# Patient Record
Sex: FEMALE | Race: WHITE | NOT HISPANIC OR LATINO | Employment: OTHER | URBAN - METROPOLITAN AREA
[De-identification: names, ages, dates, MRNs, and addresses within clinical notes are randomized per-mention and may not be internally consistent; named-entity substitution may affect disease eponyms.]

---

## 2017-11-22 ENCOUNTER — HOSPITAL ENCOUNTER (OUTPATIENT)
Dept: MAMMOGRAPHY | Facility: CLINIC | Age: 77
Discharge: HOME | End: 2017-11-22
Attending: OBSTETRICS & GYNECOLOGY | Admitting: OBSTETRICS & GYNECOLOGY
Payer: MEDICARE

## 2017-11-22 DIAGNOSIS — Z12.31 VISIT FOR SCREENING MAMMOGRAM: ICD-10-CM

## 2017-11-22 PROCEDURE — G0202 SCR MAMMO BI INCL CAD: HCPCS

## 2018-08-16 ENCOUNTER — HOSPITAL ENCOUNTER (OUTPATIENT)
Facility: CLINIC | Age: 78
Discharge: HOME | End: 2018-08-16
Attending: COLON & RECTAL SURGERY | Admitting: COLON & RECTAL SURGERY
Payer: MEDICARE

## 2018-08-16 ENCOUNTER — SURGERY (OUTPATIENT)
Age: 78
End: 2018-08-16

## 2018-08-16 VITALS
RESPIRATION RATE: 16 BRPM | DIASTOLIC BLOOD PRESSURE: 75 MMHG | SYSTOLIC BLOOD PRESSURE: 132 MMHG | HEIGHT: 60 IN | OXYGEN SATURATION: 98 % | BODY MASS INDEX: 27.48 KG/M2 | WEIGHT: 140 LBS

## 2018-08-16 LAB — COLONOSCOPY: NORMAL

## 2018-08-16 PROCEDURE — 45380 COLONOSCOPY AND BIOPSY: CPT | Performed by: COLON & RECTAL SURGERY

## 2018-08-16 PROCEDURE — 25000128 H RX IP 250 OP 636: Performed by: COLON & RECTAL SURGERY

## 2018-08-16 PROCEDURE — 88305 TISSUE EXAM BY PATHOLOGIST: CPT | Mod: 26 | Performed by: COLON & RECTAL SURGERY

## 2018-08-16 PROCEDURE — 88305 TISSUE EXAM BY PATHOLOGIST: CPT | Performed by: COLON & RECTAL SURGERY

## 2018-08-16 PROCEDURE — G0500 MOD SEDAT ENDO SERVICE >5YRS: HCPCS | Performed by: COLON & RECTAL SURGERY

## 2018-08-16 RX ORDER — LIDOCAINE 40 MG/G
CREAM TOPICAL
Status: DISCONTINUED | OUTPATIENT
Start: 2018-08-16 | End: 2018-08-16 | Stop reason: HOSPADM

## 2018-08-16 RX ORDER — FENTANYL CITRATE 50 UG/ML
INJECTION, SOLUTION INTRAMUSCULAR; INTRAVENOUS PRN
Status: DISCONTINUED | OUTPATIENT
Start: 2018-08-16 | End: 2018-08-16 | Stop reason: HOSPADM

## 2018-08-16 RX ORDER — ONDANSETRON 2 MG/ML
4 INJECTION INTRAMUSCULAR; INTRAVENOUS
Status: DISCONTINUED | OUTPATIENT
Start: 2018-08-16 | End: 2018-08-16 | Stop reason: HOSPADM

## 2018-08-16 RX ADMIN — FENTANYL CITRATE 50 MCG: 50 INJECTION, SOLUTION INTRAMUSCULAR; INTRAVENOUS at 13:31

## 2018-08-16 RX ADMIN — MIDAZOLAM 1 MG: 1 INJECTION INTRAMUSCULAR; INTRAVENOUS at 13:31

## 2018-08-16 NOTE — H&P
[facility]    History and Physical  Colon and Rectal Surgery     Date of Admission:  8/16/2018      Assessment & Plan   Maegan King is a 78 year old female who presents for colonoscopy.    Indication: history of polyps  Plan for Colonoscopy with possible biopsy, possible polypectomy. We discussed the risks, benefits and alternatives and the patient wished to proceed.    The above has been forwarded to the consulting provider.      Elie Olmedo MD  [facility], [facility]  [phone]        Code Status   Full Code    Primary Care Physician   Stephany Contreras      History is obtained from the patient    History of Present Illness   Maegan King is a 78 year old female who presents with a history of colon polyps    Past Medical History    I have reviewed this patient's medical history and updated it with pertinent information if needed.   Past Medical History:   Diagnosis Date     Unspecified essential hypertension        Past Surgical History   I have reviewed this patient's surgical history and updated it with pertinent information if needed.  Past Surgical History:   Procedure Laterality Date     APPENDECTOMY       HC TOOTH EXTRACTION W/FORCEP         Prior to Admission Medications   Prior to Admission Medications   Prescriptions Last Dose Informant Patient Reported? Taking?   calcium carbonate (CALCIUM 500) 1250 MG TABS Past Week  Yes Yes   Sig: Take 1 tablet by mouth daily.   cholecalciferol (VITAMIN D) 400 UNIT TABS Past Week  Yes Yes   Sig: Take 400 Units by mouth daily.   hydrochlorothiazide (HYDRODIURIL) 25 MG tablet 8/16/2018  No Yes   Sig: TAKE ONE TABLET BY MOUTH DAILY   lisinopril (PRINIVIL,ZESTRIL) 10 MG tablet 8/16/2018  No Yes   Sig: TAKE 1 TABLET BY MOUTH EVERY DAY      Facility-Administered Medications: None     Allergies   Allergies   Allergen Reactions     Penicillins Rash       Social History   I have reviewed this patient's social history and updated  it with pertinent information if needed. Maegan King  reports that she has never smoked. She has never used smokeless tobacco. She reports that she drinks alcohol.    Family History   I have reviewed this patient's family history and updated it with pertinent information if needed.   Family History   Problem Relation Age of Onset     Cancer Mother        Review of Systems   CONSTITUTIONAL: NEGATIVE for fever, chills, change in weight  ENT/MOUTH: NEGATIVE for ear, mouth and throat problems  RESP: NEGATIVE for significant cough or SOB  CV: NEGATIVE for chest pain, palpitations or peripheral edema    Physical Exam       BP: 143/71   Heart Rate: 60 Resp: 16 SpO2: 98 % O2 Device: None (Room air)    Vital Signs with Ranges  Heart Rate:  [60] 60  Resp:  [16] 16  BP: (143)/(71) 143/71  SpO2:  [98 %] 98 %  140 lbs 0 oz    Constitutional: awake, alert, cooperative, no apparent distress, and appears stated age  AIRWAY EXAM: Mallampatti Class I (visualization of the soft palate, fauces, uvula, anterior and posterior pillars)  Respiratory: No increased work of breathing, good air exchange, clear to auscultation bilaterally, no crackles or wheezing  Cardiovascular: Normal apical impulse, regular rate and rhythm, normal S1 and S2, no S3 or S4, and no murmur noted  ASA Class: 2 - Mild systemic disease

## 2018-08-16 NOTE — BRIEF OP NOTE
[facility] Brief Operative Note    Pre-operative diagnosis: personal and family history colon polyps    Post-operative diagnosis transverse colon polyp   Procedure: Procedure(s):  COLONOSCOPY  - Wound Class: II-Clean Contaminated   Surgeon(s): Surgeon(s) and Role:     * Elie Olmedo MD - Primary   Estimated blood loss: * No values recorded between 8/16/2018 12:00 AM and 8/16/2018  1:52 PM *    Specimens:   ID Type Source Tests Collected by Time Destination   A : transevers colon polyp Polyp Large Intestine, Transverse SURGICAL PATHOLOGY EXAM Elie Olmedo MD 8/16/2018  1:47 PM       Findings: transverse colon polyp  See provation procedure note in chart review.    Elie Olmedo MD  [facility]  [phone]

## 2018-08-20 LAB — COPATH REPORT: NORMAL

## 2019-10-11 ENCOUNTER — HOSPITAL ENCOUNTER (OUTPATIENT)
Facility: CLINIC | Age: 79
End: 2019-10-11
Attending: COLON & RECTAL SURGERY | Admitting: COLON & RECTAL SURGERY
Payer: COMMERCIAL

## 2019-10-18 ENCOUNTER — HOSPITAL ENCOUNTER (OUTPATIENT)
Facility: CLINIC | Age: 79
End: 2019-10-18
Attending: COLON & RECTAL SURGERY | Admitting: COLON & RECTAL SURGERY
Payer: MEDICARE

## 2021-07-15 DIAGNOSIS — Z11.59 ENCOUNTER FOR SCREENING FOR OTHER VIRAL DISEASES: ICD-10-CM

## 2021-08-06 ENCOUNTER — LAB (OUTPATIENT)
Dept: URGENT CARE | Facility: URGENT CARE | Age: 81
End: 2021-08-06
Attending: COLON & RECTAL SURGERY
Payer: MEDICARE

## 2021-08-06 DIAGNOSIS — Z11.59 ENCOUNTER FOR SCREENING FOR OTHER VIRAL DISEASES: ICD-10-CM

## 2021-08-06 PROCEDURE — U0003 INFECTIOUS AGENT DETECTION BY NUCLEIC ACID (DNA OR RNA); SEVERE ACUTE RESPIRATORY SYNDROME CORONAVIRUS 2 (SARS-COV-2) (CORONAVIRUS DISEASE [COVID-19]), AMPLIFIED PROBE TECHNIQUE, MAKING USE OF HIGH THROUGHPUT TECHNOLOGIES AS DESCRIBED BY CMS-2020-01-R: HCPCS

## 2021-08-06 PROCEDURE — U0005 INFEC AGEN DETEC AMPLI PROBE: HCPCS

## 2021-08-07 LAB — SARS-COV-2 RNA RESP QL NAA+PROBE: NEGATIVE

## 2021-08-09 ENCOUNTER — HOSPITAL ENCOUNTER (OUTPATIENT)
Facility: CLINIC | Age: 81
Discharge: HOME | End: 2021-08-09
Attending: COLON & RECTAL SURGERY | Admitting: COLON & RECTAL SURGERY
Payer: MEDICARE

## 2021-08-09 VITALS
OXYGEN SATURATION: 99 % | SYSTOLIC BLOOD PRESSURE: 135 MMHG | WEIGHT: 139 LBS | HEIGHT: 60 IN | DIASTOLIC BLOOD PRESSURE: 65 MMHG | HEART RATE: 49 BPM | BODY MASS INDEX: 27.29 KG/M2 | RESPIRATION RATE: 20 BRPM

## 2021-08-09 LAB — COLONOSCOPY: NORMAL

## 2021-08-09 PROCEDURE — G0500 MOD SEDAT ENDO SERVICE >5YRS: HCPCS | Performed by: COLON & RECTAL SURGERY

## 2021-08-09 PROCEDURE — 99153 MOD SED SAME PHYS/QHP EA: CPT | Performed by: COLON & RECTAL SURGERY

## 2021-08-09 PROCEDURE — 88305 TISSUE EXAM BY PATHOLOGIST: CPT | Mod: TC | Performed by: COLON & RECTAL SURGERY

## 2021-08-09 PROCEDURE — 45380 COLONOSCOPY AND BIOPSY: CPT | Mod: PT | Performed by: COLON & RECTAL SURGERY

## 2021-08-09 PROCEDURE — 250N000011 HC RX IP 250 OP 636: Performed by: COLON & RECTAL SURGERY

## 2021-08-09 RX ORDER — ONDANSETRON 4 MG/1
4 TABLET, ORALLY DISINTEGRATING ORAL EVERY 6 HOURS PRN
Status: DISCONTINUED | OUTPATIENT
Start: 2021-08-09 | End: 2021-08-09 | Stop reason: HOSPADM

## 2021-08-09 RX ORDER — NALOXONE HYDROCHLORIDE 0.4 MG/ML
0.4 INJECTION, SOLUTION INTRAMUSCULAR; INTRAVENOUS; SUBCUTANEOUS
Status: DISCONTINUED | OUTPATIENT
Start: 2021-08-09 | End: 2021-08-09 | Stop reason: HOSPADM

## 2021-08-09 RX ORDER — NALOXONE HYDROCHLORIDE 0.4 MG/ML
0.2 INJECTION, SOLUTION INTRAMUSCULAR; INTRAVENOUS; SUBCUTANEOUS
Status: DISCONTINUED | OUTPATIENT
Start: 2021-08-09 | End: 2021-08-09 | Stop reason: HOSPADM

## 2021-08-09 RX ORDER — FENTANYL CITRATE 50 UG/ML
INJECTION, SOLUTION INTRAMUSCULAR; INTRAVENOUS PRN
Status: COMPLETED | OUTPATIENT
Start: 2021-08-09 | End: 2021-08-09

## 2021-08-09 RX ORDER — FLUMAZENIL 0.1 MG/ML
0.2 INJECTION, SOLUTION INTRAVENOUS
Status: DISCONTINUED | OUTPATIENT
Start: 2021-08-09 | End: 2021-08-09 | Stop reason: HOSPADM

## 2021-08-09 RX ORDER — PROCHLORPERAZINE MALEATE 5 MG
5 TABLET ORAL EVERY 6 HOURS PRN
Status: DISCONTINUED | OUTPATIENT
Start: 2021-08-09 | End: 2021-08-09 | Stop reason: HOSPADM

## 2021-08-09 RX ORDER — ONDANSETRON 2 MG/ML
4 INJECTION INTRAMUSCULAR; INTRAVENOUS
Status: DISCONTINUED | OUTPATIENT
Start: 2021-08-09 | End: 2021-08-09 | Stop reason: HOSPADM

## 2021-08-09 RX ORDER — ONDANSETRON 2 MG/ML
4 INJECTION INTRAMUSCULAR; INTRAVENOUS EVERY 6 HOURS PRN
Status: DISCONTINUED | OUTPATIENT
Start: 2021-08-09 | End: 2021-08-09 | Stop reason: HOSPADM

## 2021-08-09 RX ORDER — LIDOCAINE 40 MG/G
CREAM TOPICAL
Status: DISCONTINUED | OUTPATIENT
Start: 2021-08-09 | End: 2021-08-09 | Stop reason: HOSPADM

## 2021-08-09 RX ADMIN — MIDAZOLAM 1 MG: 1 INJECTION INTRAMUSCULAR; INTRAVENOUS at 10:29

## 2021-08-09 RX ADMIN — FENTANYL CITRATE 50 MCG: 50 INJECTION, SOLUTION INTRAMUSCULAR; INTRAVENOUS at 10:28

## 2021-08-09 RX ADMIN — FENTANYL CITRATE 50 MCG: 50 INJECTION, SOLUTION INTRAMUSCULAR; INTRAVENOUS at 10:49

## 2021-08-09 ASSESSMENT — MIFFLIN-ST. JEOR: SCORE: 1027

## 2021-08-09 NOTE — H&P
Pre-Endoscopy History and Physical     Maegan King MRN# [id number]   YOB: 1940 Age: 81 year old     Date of Procedure: 8/9/2021  Primary care provider: Stephany Contreras  Type of Endoscopy: Colonoscopy  Reason for Procedure: Surveillance  Type of Anesthesia Anticipated: Moderate Sedation    HPI:    Maegan is a 81 year old female who will be undergoing the above procedure.      A history and physical has been performed. The patient's medications and allergies have been reviewed. The risks and benefits of the procedure and the sedation options and risks were discussed with the patient.  All questions were answered and informed consent was obtained.      She denies a personal or family history of anesthesia complications or bleeding disorders.     Allergies   Allergen Reactions     Penicillins Rash        No current facility-administered medications on file prior to encounter.  calcium carbonate (CALCIUM 500) 1250 MG TABS, Take 1 tablet by mouth daily.  cholecalciferol (VITAMIN D) 400 UNIT TABS, Take 400 Units by mouth daily.  hydrochlorothiazide (HYDRODIURIL) 25 MG tablet, TAKE ONE TABLET BY MOUTH DAILY  lisinopril (PRINIVIL,ZESTRIL) 10 MG tablet, TAKE 1 TABLET BY MOUTH EVERY DAY        Patient Active Problem List   Diagnosis     Essential hypertension     Medial epicondylitis        Past Medical History:   Diagnosis Date     Unspecified essential hypertension         Past Surgical History:   Procedure Laterality Date     APPENDECTOMY       COLONOSCOPY N/A 8/16/2018    Procedure: COMBINED COLONOSCOPY, SINGLE OR MULTIPLE BIOPSY/POLYPECTOMY BY BIOPSY;  COLONOSCOPY ;  Surgeon: Elie Olmedo MD;  Location:  GI     HC TOOTH EXTRACTION W/FORCEP         Social History     Tobacco Use     Smoking status: Never Smoker     Smokeless tobacco: Never Used   Substance Use Topics     Alcohol use: Yes     Comment: 1 a day       Family History   Problem Relation Age of Onset     Cancer Mother        REVIEW OF  SYSTEMS:     5 point ROS negative except as noted above in HPI, including Gen., Resp., CV, GI &  system review.      PHYSICAL EXAM:   There were no vitals taken for this visit. Estimated body mass index is 27.34 kg/m  as calculated from the following:    Height as of 8/16/18: 1.524 m (5').    Weight as of 8/16/18: 63.5 kg (140 lb).   GENERAL APPEARANCE: healthy and alert  MENTAL STATUS: alert  AIRWAY EXAM: Mallampatti Class I (visualization of the soft palate, fauces, uvula, anterior and posterior pillars)  RESP: lungs clear to auscultation - no rales, rhonchi or wheezes  CV: regular rates and rhythm      IMPRESSION   ASA Class 2 - Mild systemic disease        PLAN:     Plan for colonoscopy. We discussed the risks, benefits and alternatives and the patient wished to proceed.    The above has been forwarded to the consulting provider.      Summer Padron MD  [facility]  Phone: [phone]  Fax: [phone]  August 9, 2021

## 2021-08-10 LAB
PATH REPORT.COMMENTS IMP SPEC: NORMAL
PATH REPORT.COMMENTS IMP SPEC: NORMAL
PATH REPORT.FINAL DX SPEC: NORMAL
PATH REPORT.GROSS SPEC: NORMAL
PATH REPORT.MICROSCOPIC SPEC OTHER STN: NORMAL
PATH REPORT.RELEVANT HX SPEC: NORMAL
PHOTO IMAGE: NORMAL

## 2021-08-10 PROCEDURE — 88305 TISSUE EXAM BY PATHOLOGIST: CPT | Mod: 26 | Performed by: PATHOLOGY

## 2021-12-23 ENCOUNTER — HOSPITAL ENCOUNTER (EMERGENCY)
Facility: CLINIC | Age: 81
Discharge: HOME | End: 2021-12-23
Attending: EMERGENCY MEDICINE | Admitting: EMERGENCY MEDICINE
Payer: MEDICARE

## 2021-12-23 VITALS
HEART RATE: 79 BPM | RESPIRATION RATE: 20 BRPM | OXYGEN SATURATION: 100 % | DIASTOLIC BLOOD PRESSURE: 81 MMHG | WEIGHT: 138 LBS | BODY MASS INDEX: 26.95 KG/M2 | SYSTOLIC BLOOD PRESSURE: 196 MMHG | TEMPERATURE: 97.4 F

## 2021-12-23 DIAGNOSIS — R55 SYNCOPE, UNSPECIFIED SYNCOPE TYPE: ICD-10-CM

## 2021-12-23 DIAGNOSIS — E87.1 HYPONATREMIA: ICD-10-CM

## 2021-12-23 LAB
ANION GAP SERPL CALCULATED.3IONS-SCNC: 6 MMOL/L (ref 3–14)
ATRIAL RATE - MUSE: 74 BPM
BASOPHILS # BLD AUTO: 0.1 10E3/UL (ref 0–0.2)
BASOPHILS NFR BLD AUTO: 0 %
BUN SERPL-MCNC: 13 MG/DL (ref 7–30)
CALCIUM SERPL-MCNC: 9.3 MG/DL (ref 8.5–10.1)
CHLORIDE BLD-SCNC: 91 MMOL/L (ref 94–109)
CO2 SERPL-SCNC: 29 MMOL/L (ref 20–32)
CREAT SERPL-MCNC: 0.59 MG/DL (ref 0.52–1.04)
DIASTOLIC BLOOD PRESSURE - MUSE: NORMAL MMHG
EOSINOPHIL # BLD AUTO: 0 10E3/UL (ref 0–0.7)
EOSINOPHIL NFR BLD AUTO: 0 %
ERYTHROCYTE [DISTWIDTH] IN BLOOD BY AUTOMATED COUNT: 11.9 % (ref 10–15)
GFR SERPL CREATININE-BSD FRML MDRD: >90 ML/MIN/1.73M2
GLUCOSE BLD-MCNC: 124 MG/DL (ref 70–99)
HCT VFR BLD AUTO: 39.2 % (ref 35–47)
HGB BLD-MCNC: 13.5 G/DL (ref 11.7–15.7)
HOLD SPECIMEN: NORMAL
HOLD SPECIMEN: NORMAL
IMM GRANULOCYTES # BLD: 0.1 10E3/UL
IMM GRANULOCYTES NFR BLD: 1 %
INTERPRETATION ECG - MUSE: NORMAL
LYMPHOCYTES # BLD AUTO: 1.2 10E3/UL (ref 0.8–5.3)
LYMPHOCYTES NFR BLD AUTO: 11 %
MCH RBC QN AUTO: 33.5 PG (ref 26.5–33)
MCHC RBC AUTO-ENTMCNC: 34.4 G/DL (ref 31.5–36.5)
MCV RBC AUTO: 97 FL (ref 78–100)
MONOCYTES # BLD AUTO: 1 10E3/UL (ref 0–1.3)
MONOCYTES NFR BLD AUTO: 9 %
NEUTROPHILS # BLD AUTO: 9 10E3/UL (ref 1.6–8.3)
NEUTROPHILS NFR BLD AUTO: 79 %
NRBC # BLD AUTO: 0 10E3/UL
NRBC BLD AUTO-RTO: 0 /100
P AXIS - MUSE: 56 DEGREES
PLATELET # BLD AUTO: 253 10E3/UL (ref 150–450)
POTASSIUM BLD-SCNC: 3.6 MMOL/L (ref 3.4–5.3)
PR INTERVAL - MUSE: 170 MS
QRS DURATION - MUSE: 78 MS
QT - MUSE: 406 MS
QTC - MUSE: 450 MS
R AXIS - MUSE: 1 DEGREES
RBC # BLD AUTO: 4.03 10E6/UL (ref 3.8–5.2)
SODIUM SERPL-SCNC: 126 MMOL/L (ref 133–144)
SYSTOLIC BLOOD PRESSURE - MUSE: NORMAL MMHG
T AXIS - MUSE: 35 DEGREES
TROPONIN I SERPL HS-MCNC: 10 NG/L
VENTRICULAR RATE- MUSE: 74 BPM
WBC # BLD AUTO: 11.3 10E3/UL (ref 4–11)

## 2021-12-23 PROCEDURE — 93005 ELECTROCARDIOGRAM TRACING: CPT

## 2021-12-23 PROCEDURE — 85025 COMPLETE CBC W/AUTO DIFF WBC: CPT | Performed by: EMERGENCY MEDICINE

## 2021-12-23 PROCEDURE — 84484 ASSAY OF TROPONIN QUANT: CPT | Performed by: EMERGENCY MEDICINE

## 2021-12-23 PROCEDURE — 99284 EMERGENCY DEPT VISIT MOD MDM: CPT

## 2021-12-23 PROCEDURE — 36415 COLL VENOUS BLD VENIPUNCTURE: CPT | Performed by: EMERGENCY MEDICINE

## 2021-12-23 PROCEDURE — 80048 BASIC METABOLIC PNL TOTAL CA: CPT | Performed by: EMERGENCY MEDICINE

## 2021-12-23 ASSESSMENT — ENCOUNTER SYMPTOMS
ABDOMINAL PAIN: 0
VOMITING: 0
APPETITE CHANGE: 0
MYALGIAS: 0

## 2021-12-23 NOTE — ED PROVIDER NOTES
History   Chief Complaint:  Syncope       HPI   Maegan King is a 81 year old female with history of hypertension who presents with a syncopal episode. The patient reports that she was in the kitchen chatting with her  last night around 2115 when she passed out. Her  said she all of the sudden put her head on the counter. He proceeded to do some chest compressions without checking her pulse, and called 911.  She woke up right away after she went to the floor.  Today she had an appointment today with an oral surgeon, and her nurse noticed that her blood pressure was high. She denies any injuries. She has no history of syncopal episodes. She doesn't remember feeling warmer than normal last night. Denies any appetite change. No history of heart problems. No abdominal pain or vomiting. No loss of control of bladder. No chest pain other than soreness from the chest compressions. She says he had breakfast this morning and was able to keep that down.    Review of Systems   Constitutional: Negative for appetite change.   Cardiovascular: Negative for chest pain.   Gastrointestinal: Negative for abdominal pain and vomiting.   Musculoskeletal: Negative for myalgias.   Neurological: Positive for syncope.   All other systems reviewed and are negative.    Allergies:  Penicillins    Medications:  Fosamax  Hydrochlorothiazide  Prinivil/Zestril    Past Medical History:     Hypertension  Medial epicondylitis    Osteopenia  Colon polyps  Thyroid nodule  Diverticulitis    Past Surgical History:    Appendectomy  Tooth extraction     Family History:    Mother: Cancer  Father: Kidney failure, Colon polyp    Social History:  The patient presents alone. She lives with her  who is on his way.    Physical Exam     Patient Vitals for the past 24 hrs:   BP Temp Temp src Pulse Resp SpO2 Weight   12/23/21 1320 (!) 196/81 97.4  F (36.3  C) Temporal 79 20 100 % 62.6 kg (138 lb)     Physical Exam  Nursing note and vitals  reviewed.    Constitutional:  Appears comfortable.    HENT:    Nose normal.  No discharge.      Oral mucosa is moist.  Eyes:    Conjunctivae are normal without injection.  Pupils are equal.  Cardiovascular:  Normal rate, regular rhythm with normal S1 and S2.      Normal heart sounds and peripheral pulses 2+ and equal.       No murmur or opal.  Pulmonary:  Effort normal and breath sounds clear to auscultation bilaterally.     No respiratory distress.  No stridor.     No wheezes. No rales.     GI:    Soft. No distension and no mass. No tenderness.   Musculoskeletal:  Normal range of motion. No extremity deformity.     No edema and no tenderness.    Neurological:   Alert and oriented. No focal weakness.     Exhibits good muscle tone. Coordination normal.      GCS eye subscore is 4. GCS verbal subscore is 5.      GCS motor subscore is 6.   Skin:    Skin is warm and dry. No rash noted. No diaphoresis.      No erythema. No pallor.  No lesions.  Psychiatric:   Behavior is normal. Appropriate mood and affect.     Judgment and thought content normal.         Emergency Department Course   ECG  ECG taken at 1343, ECG read at 1345  Normal sinus rhythm with sinus arrhythmia  Normal ECG  Rate 74 bpm. MT interval 170 ms. QRS duration 78 ms. QT/QTc 406/450 ms. P-R-T axes 56 1 35.     Laboratory:  Troponin (Collected 1338): 10     CBC: WBC 11.3 (H), HGB 13.5,       BMP: Sodium 126 (H), Chloride 91 (L), Glucose 124 (H), o/w WNL (Creatinine 0.59)      Emergency Department Course:  Reviewed:  I reviewed nursing notes, vitals, past medical history and Care Everywhere    Assessments:  1331 I obtained history and examined the patient as noted above.     Disposition:  The patient was discharged to home.     Impression & Plan   Medical Decision Making:  Patient comes in after she had a syncopal spell last night.  She felt fine today.  Went to the oral surgeon and her blood pressure was up so he sent her here.  Here her EKG is  normal.  Her basic metabolic panel shows a glucose is up slightly 124 and her sodium is low at 126 but the rest of it is normal.  Her CBC shows a white count is mildly elevated 11.3 and a normal troponin of 10.  The patient had no arrhythmias over the hour and a half that she was here being monitored.  She felt fine was taking oral and has no headache.  She did not have any neurologic deficits.  Her  did a couple of pumps of CPR on her but he did not check her pulse and she woke up right away so he stopped.  It sounds like this was not cardiac but probably vasovagal.  She had been standing for a period of time talking and then just felt lightheaded and went down.  No injury.  However I want her to be evaluated by her doctor in the clinic next week.  She has a low sodium and this should be rechecked.  I do not have a reason although the last sodium I believe was around 132.  If she passes out again over the weekend, she needs to return to the ER for further work-up.  Her last blood pressure was 167/71.  She did not take her morning blood pressure medication and was given instruction to take it when she gets home.    Stay hydrated, set an appointment up with your doctor next week in the clinic for a recheck.  Have them check your sodium again.  If you pass out again at any time over the weekend, return to the emergency room for further evaluation.  Take your blood pressure medication as directed by your doctor.    Diagnosis:    ICD-10-CM    1. Syncope, unspecified syncope type  R55    2. Hyponatremia  E87.1      Scribe Disclosure:  Keiko KEYES, am serving as a scribe at 1:31 PM on 12/23/2021 to document services personally performed by Sandrine Contreras MD based on my observations and the provider's statements to me.      Sandrine Contreras MD  12/23/21 [id number]

## 2021-12-23 NOTE — DISCHARGE INSTRUCTIONS
Stay hydrated, set an appointment up with your doctor next week in the clinic for a recheck.  Have them check your sodium again.  If you pass out again at any time over the weekend, return to the emergency room for further evaluation.  Take your blood pressure medication as directed by your doctor.

## 2021-12-23 NOTE — ED TRIAGE NOTES
Last night around 2045 pt had a syncopal episode while standing in the kitchen.  witnessed. Pt was unconscious on the floor,  started chest compressions and rescue breathing. Didn't check for pulse first. Pt woke up soon after. BP was 190/p at oral surgeons today.

## 2022-01-15 ENCOUNTER — APPOINTMENT (OUTPATIENT)
Dept: CT IMAGING | Facility: CLINIC | Age: 82
End: 2022-01-15
Attending: EMERGENCY MEDICINE
Payer: MEDICARE

## 2022-01-15 ENCOUNTER — HOSPITAL ENCOUNTER (EMERGENCY)
Facility: CLINIC | Age: 82
Discharge: HOME | End: 2022-01-15
Attending: EMERGENCY MEDICINE | Admitting: EMERGENCY MEDICINE
Payer: MEDICARE

## 2022-01-15 VITALS
HEART RATE: 78 BPM | TEMPERATURE: 97.7 F | SYSTOLIC BLOOD PRESSURE: 189 MMHG | DIASTOLIC BLOOD PRESSURE: 74 MMHG | OXYGEN SATURATION: 100 % | HEIGHT: 61 IN | BODY MASS INDEX: 26.43 KG/M2 | RESPIRATION RATE: 16 BRPM | WEIGHT: 140 LBS

## 2022-01-15 DIAGNOSIS — R55 SYNCOPE, UNSPECIFIED SYNCOPE TYPE: ICD-10-CM

## 2022-01-15 LAB
ANION GAP SERPL CALCULATED.3IONS-SCNC: 10 MMOL/L (ref 3–14)
ATRIAL RATE - MUSE: 71 BPM
BASOPHILS # BLD AUTO: 0 10E3/UL (ref 0–0.2)
BASOPHILS NFR BLD AUTO: 1 %
BUN SERPL-MCNC: 9 MG/DL (ref 7–30)
CALCIUM SERPL-MCNC: 8.6 MG/DL (ref 8.5–10.1)
CHLORIDE BLD-SCNC: 90 MMOL/L (ref 94–109)
CO2 SERPL-SCNC: 26 MMOL/L (ref 20–32)
CREAT SERPL-MCNC: 0.61 MG/DL (ref 0.52–1.04)
D DIMER PPP FEU-MCNC: 3.61 UG/ML FEU (ref 0–0.5)
DIASTOLIC BLOOD PRESSURE - MUSE: NORMAL MMHG
EOSINOPHIL # BLD AUTO: 0 10E3/UL (ref 0–0.7)
EOSINOPHIL NFR BLD AUTO: 0 %
ERYTHROCYTE [DISTWIDTH] IN BLOOD BY AUTOMATED COUNT: 11.9 % (ref 10–15)
GFR SERPL CREATININE-BSD FRML MDRD: 90 ML/MIN/1.73M2
GLUCOSE BLD-MCNC: 108 MG/DL (ref 70–99)
HCT VFR BLD AUTO: 39 % (ref 35–47)
HGB BLD-MCNC: 13.5 G/DL (ref 11.7–15.7)
IMM GRANULOCYTES # BLD: 0 10E3/UL
IMM GRANULOCYTES NFR BLD: 0 %
INTERPRETATION ECG - MUSE: NORMAL
LYMPHOCYTES # BLD AUTO: 1.1 10E3/UL (ref 0.8–5.3)
LYMPHOCYTES NFR BLD AUTO: 15 %
MCH RBC QN AUTO: 33.6 PG (ref 26.5–33)
MCHC RBC AUTO-ENTMCNC: 34.6 G/DL (ref 31.5–36.5)
MCV RBC AUTO: 97 FL (ref 78–100)
MONOCYTES # BLD AUTO: 0.7 10E3/UL (ref 0–1.3)
MONOCYTES NFR BLD AUTO: 9 %
NEUTROPHILS # BLD AUTO: 5.6 10E3/UL (ref 1.6–8.3)
NEUTROPHILS NFR BLD AUTO: 75 %
NRBC # BLD AUTO: 0 10E3/UL
NRBC BLD AUTO-RTO: 0 /100
P AXIS - MUSE: -62 DEGREES
PLATELET # BLD AUTO: 270 10E3/UL (ref 150–450)
POTASSIUM BLD-SCNC: 3.4 MMOL/L (ref 3.4–5.3)
PR INTERVAL - MUSE: 164 MS
QRS DURATION - MUSE: 72 MS
QT - MUSE: 410 MS
QTC - MUSE: 445 MS
R AXIS - MUSE: 26 DEGREES
RBC # BLD AUTO: 4.02 10E6/UL (ref 3.8–5.2)
SODIUM SERPL-SCNC: 126 MMOL/L (ref 133–144)
SYSTOLIC BLOOD PRESSURE - MUSE: NORMAL MMHG
T AXIS - MUSE: 56 DEGREES
TROPONIN I SERPL HS-MCNC: 6 NG/L
VENTRICULAR RATE- MUSE: 71 BPM
WBC # BLD AUTO: 7.5 10E3/UL (ref 4–11)

## 2022-01-15 PROCEDURE — 85379 FIBRIN DEGRADATION QUANT: CPT | Performed by: EMERGENCY MEDICINE

## 2022-01-15 PROCEDURE — 250N000011 HC RX IP 250 OP 636: Performed by: EMERGENCY MEDICINE

## 2022-01-15 PROCEDURE — G1004 CDSM NDSC: HCPCS

## 2022-01-15 PROCEDURE — 82374 ASSAY BLOOD CARBON DIOXIDE: CPT | Performed by: EMERGENCY MEDICINE

## 2022-01-15 PROCEDURE — 93005 ELECTROCARDIOGRAM TRACING: CPT

## 2022-01-15 PROCEDURE — 36415 COLL VENOUS BLD VENIPUNCTURE: CPT | Performed by: EMERGENCY MEDICINE

## 2022-01-15 PROCEDURE — 84484 ASSAY OF TROPONIN QUANT: CPT | Performed by: EMERGENCY MEDICINE

## 2022-01-15 PROCEDURE — 71275 CT ANGIOGRAPHY CHEST: CPT | Mod: ME

## 2022-01-15 PROCEDURE — 85025 COMPLETE CBC W/AUTO DIFF WBC: CPT | Performed by: EMERGENCY MEDICINE

## 2022-01-15 PROCEDURE — 99285 EMERGENCY DEPT VISIT HI MDM: CPT | Mod: 25

## 2022-01-15 PROCEDURE — 250N000009 HC RX 250: Performed by: EMERGENCY MEDICINE

## 2022-01-15 RX ORDER — IOPAMIDOL 755 MG/ML
58 INJECTION, SOLUTION INTRAVASCULAR ONCE
Status: COMPLETED | OUTPATIENT
Start: 2022-01-15 | End: 2022-01-15

## 2022-01-15 RX ADMIN — IOPAMIDOL 58 ML: 755 INJECTION, SOLUTION INTRAVENOUS at 13:06

## 2022-01-15 RX ADMIN — SODIUM CHLORIDE 85 ML: 9 INJECTION, SOLUTION INTRAVENOUS at 13:07

## 2022-01-15 ASSESSMENT — MIFFLIN-ST. JEOR: SCORE: 1047.42

## 2022-01-15 NOTE — ED PROVIDER NOTES
History     Chief Complaint:  Syncope       HPI   Maegan King is a 81 year old female who presents with concern of a syncopal episode.  This patient suffered syncope last night when she went to the bathroom.  Her  heard noise from the bathroom and checked on her and found her unconscious.  She was unconscious briefly maybe a couple minutes.  She had no prodrome before this episode.  She does not feel she was injured during this fainting episode.  She experienced a similar episode 3 weeks ago as she fainted from a standing position.  The following day she had an oral surgery appointment and due to the concern of the syncopal episode she was referred in for evaluation.  She states testing was performed and was negative so she was discharged and subsequently had a Zio patch placed to assess for arrhythmia.  That patch is due to turn in in 2 days.  She denies history of syncope beyond these 2 episodes in her lifetime.  Denies chest pain or dyspnea.  No abdominal pain.  No nausea.  Denies recent medication changes.  She is treated for hypertension and in follow-up after the first syncopal episode her doctor did not change any medication dosing.  Patient denies recent illness preceding these episodes.  Denies diarrhea.  Denies black or bloody stool.  She was incontinent of stool after one of the syncopal episodes.  Denies urinary symptoms.  Denies history of pulmonary embolism or other thrombus.  No recent long distance travel or thrombus risk factors.  Denies history of coronary artery disease.  Denies history of structural heart disease.  Currently just feels woozy but otherwise seems recovered.  No other complaints.    ROS:  Review of Systems  All systems otherwise negative or per HPI    Allergies:  Penicillins     Medications:    calcium carbonate (CALCIUM 500) 1250 MG TABS  cholecalciferol (VITAMIN D) 400 UNIT TABS  hydrochlorothiazide (HYDRODIURIL) 25 MG tablet  lisinopril (PRINIVIL,ZESTRIL) 10 MG  "tablet        Past Medical History:    Past Medical History:   Diagnosis Date     Unspecified essential hypertension      Patient Active Problem List   Diagnosis     Essential hypertension     Medial epicondylitis        Past Surgical History:    Past Surgical History:   Procedure Laterality Date     APPENDECTOMY       COLONOSCOPY N/A 8/16/2018    Procedure: COMBINED COLONOSCOPY, SINGLE OR MULTIPLE BIOPSY/POLYPECTOMY BY BIOPSY;  COLONOSCOPY ;  Surgeon: Elie Olmedo MD;  Location:  GI     COLONOSCOPY N/A 8/9/2021    Procedure: COLONOSCOPY, WITH POLYPECTOMY AND BIOPSY;  Surgeon: Vianey Padron MD;  Location:  GI     HC TOOTH EXTRACTION W/FORCEP          Family History:    family history includes Cancer in her mother.    Social History:   reports that she has never smoked. She has never used smokeless tobacco. She reports current alcohol use.  PCP: Stephany Contreras     Physical Exam     Patient Vitals for the past 24 hrs:   BP Temp Temp src Pulse Resp SpO2 Height Weight   01/15/22 1047 (!) 189/74 97.7  F (36.5  C) Oral 78 16 100 % 1.549 m (5' 1\") 63.5 kg (140 lb)        Physical Exam  SKIN:  Warm, dry.  Atraumatic.  HEMATOLOGIC/IMMUNOLOGIC/LYMPHATIC:  No pallor.  No limb edema.  HENT: No carotid bruit.  No JVD.  EYES:  Conjunctivae normal.  CARDIOVASCULAR:  Regular rate and rhythm.  No murmur.  No rub  RESPIRATORY:  No respiratory distress, breath sounds equal and normal.  GASTROINTESTINAL:  Soft, nontender abdomen.  No distention.  No palpable mass.  MUSCULOSKELETAL: Painless active range of motion of head neck torso and limbs.  Normal body habitus.  NEUROLOGIC:  Alert, conversant.  PSYCHIATRIC:  Normal mood.    Emergency Department Course   ECG:  ECG taken at 1054, ECG read at 1059  Unusual P axis, possible ectopic atrial rhythm   No significant changes as compared to prior, dated 12/23/21.  Rate 71 bpm. ME interval 164 ms. QRS duration 72 ms. QT/QTc 410/445 ms. P-R-T axes -62 26 56. "       Imaging:  CT Chest Pulmonary Embolism w Contrast   Final Result   IMPRESSION:   1.  No evidence for pulmonary embolism.   2.  Mild parabronchial wall thickening may be a mild bronchitis.   3.  Trace right pleural fluid.   4.  T11 compression deformity is new since 2009. Recommend clinical correlation.            Report per radiology    Laboratory:  Labs Ordered and Resulted from Time of ED Arrival to Time of ED Departure   D DIMER QUANTITATIVE - Abnormal       Result Value    D-Dimer Quantitative 3.61 (*)    BASIC METABOLIC PANEL - Abnormal    Sodium 126 (*)     Potassium 3.4      Chloride 90 (*)     Carbon Dioxide (CO2) 26      Anion Gap 10      Urea Nitrogen 9      Creatinine 0.61      Calcium 8.6      Glucose 108 (*)     GFR Estimate 90     CBC WITH PLATELETS AND DIFFERENTIAL - Abnormal    WBC Count 7.5      RBC Count 4.02      Hemoglobin 13.5      Hematocrit 39.0      MCV 97      MCH 33.6 (*)     MCHC 34.6      RDW 11.9      Platelet Count 270      % Neutrophils 75      % Lymphocytes 15      % Monocytes 9      % Eosinophils 0      % Basophils 1      % Immature Granulocytes 0      NRBCs per 100 WBC 0      Absolute Neutrophils 5.6      Absolute Lymphocytes 1.1      Absolute Monocytes 0.7      Absolute Eosinophils 0.0      Absolute Basophils 0.0      Absolute Immature Granulocytes 0.0      Absolute NRBCs 0.0     TROPONIN I - Normal    Troponin I High Sensitivity 6          Procedures   None     Emergency Department Course:             Reviewed:  I reviewed nursing notes, vitals and past medical history    Assessments:   I obtained history and examined the patient as noted above.    I rechecked the patient and explained findings.    Consults:   None     Interventions:  Medications   sodium chloride (PF) 0.9% PF flush 3 mL (has no administration in time range)   sodium chloride (PF) 0.9% PF flush 3 mL (has no administration in time range)   iopamidol (ISOVUE-370) solution 58 mL (58 mLs Intravenous Given 1/15/22  1306)   sodium chloride 0.9 % bag 500mL for CT scan flush use (85 mLs Intravenous Given 1/15/22 1307)        Disposition:  The patient was discharged to home.     Impression & Plan    CMS Diagnoses: None    Medical Decision Making:  This patient presents after her second syncopal episode in 3 weeks.  She currently has a Zio patch on as far as ongoing diagnostics.  That is due to be turned in in 2 days.  She feels recovered from the second episode.  Testing performed as above with consideration of cardiopulmonary etiology and hematologic or metabolic derangement.  Testing was all reassuring although D-dimer elevated prompting CT scan chest.  I think the patient can be discharged for further follow-up as an outpatient but I advised to return if another episode or if new concerns.    Diagnosis:    ICD-10-CM    1. Syncope, unspecified syncope type  R55         Discharge Medications:  Discharge Medication List as of 1/15/2022  1:50 PM           1/15/2022   Rafael Luz MD Moe, James Thomas, MD  01/15/22 [id number]

## 2022-01-15 NOTE — ED TRIAGE NOTES
"Patient passed out during the night. IS on a monitor. \"I think it is my blood pressure.\" Denies CP or SOB. States passed out 2 times in the past 3 weeks. Hit head on tiled floor.  "

## 2023-09-06 RX ORDER — LISINOPRIL 40 MG/1
40 TABLET ORAL DAILY
Qty: 90 TABLET | OUTPATIENT
Start: 2023-09-06

## 2023-09-06 RX ORDER — AMLODIPINE BESYLATE 5 MG/1
5 TABLET ORAL DAILY
Qty: 90 TABLET | OUTPATIENT
Start: 2023-09-06

## 2025-05-20 ENCOUNTER — TRANSFERRED RECORDS (OUTPATIENT)
Dept: HEALTH INFORMATION MANAGEMENT | Facility: CLINIC | Age: 85
End: 2025-05-20

## (undated) RX ORDER — FENTANYL CITRATE 50 UG/ML
INJECTION, SOLUTION INTRAMUSCULAR; INTRAVENOUS
Status: DISPENSED
Start: 2018-08-16

## (undated) RX ORDER — FENTANYL CITRATE 50 UG/ML
INJECTION, SOLUTION INTRAMUSCULAR; INTRAVENOUS
Status: DISPENSED
Start: 2021-08-09